# Patient Record
Sex: FEMALE | Race: WHITE | NOT HISPANIC OR LATINO | Employment: STUDENT | URBAN - METROPOLITAN AREA
[De-identification: names, ages, dates, MRNs, and addresses within clinical notes are randomized per-mention and may not be internally consistent; named-entity substitution may affect disease eponyms.]

---

## 2020-01-23 ENCOUNTER — HOSPITAL ENCOUNTER (EMERGENCY)
Facility: OTHER | Age: 25
Discharge: HOME | End: 2020-01-23
Attending: EMERGENCY MEDICINE
Payer: COMMERCIAL

## 2020-01-23 VITALS
RESPIRATION RATE: 18 BRPM | DIASTOLIC BLOOD PRESSURE: 83 MMHG | SYSTOLIC BLOOD PRESSURE: 127 MMHG | HEART RATE: 107 BPM | TEMPERATURE: 99 F | OXYGEN SATURATION: 100 % | WEIGHT: 110 LBS

## 2020-01-23 DIAGNOSIS — O03.9 MISCARRIAGE: Primary | ICD-10-CM

## 2020-01-23 LAB
B-HCG UR QL: NEGATIVE
CTP QC/QA: YES
HCG INTACT+B SERPL-ACNC: 12 MIU/ML

## 2020-01-23 PROCEDURE — 81025 URINE PREGNANCY TEST: CPT | Performed by: EMERGENCY MEDICINE

## 2020-01-23 PROCEDURE — 99283 EMERGENCY DEPT VISIT LOW MDM: CPT

## 2020-01-23 PROCEDURE — 84702 CHORIONIC GONADOTROPIN TEST: CPT

## 2020-01-24 NOTE — ED TRIAGE NOTES
Pt presents to the ed with c/o vaginal bleeding with abdominal cramping x 3hrs PTA. Pt reports being 4 weeks pregnant bleeding through 1 pad. Pt denies any other s/s. Pt is aao, vss, with NAD noted.     LOC: The patient is awake, alert, oriented and speaking appropriately at this time.  APPEARANCE: Patient resting comfortably and appears to be in no acute distress at this time. Patient is clean and well groomed, patient's clothing is properly fastened.  SKIN:The skin is warm and dry, color consistent with ethnicity, patient has normal skin turgor and moist mucus membranes, skin intact, no breakdown or brusing noted.  MUSCULOSKELETAL: Patient moving all extremities well, no obvious swelling or deformities noted.  RESPIRATORY: Airway is open and patent, respirations are spontaneous, patient has a normal effort and rate, no accessory muscle use noted.  CARDIAC: Patient has a normal rate and rhythm, no periphreal edema noted in any extremity, capillary refill < 3 seconds in all extremities  ABDOMEN: + lower abdominal cramping. Soft and non tender to palpation, no abdominal distention noted. Bowel sounds present in all four quadrants.  : + vaginal bleeding  NEUROLOGIC: Eyes open spontaneously, behavior appropriate to situation, follows commands, facial expression symmetrical, bilateral hand grasp equal and even, purposeful motor response noted, normal sensation in all extremities when touched with a finger.

## 2020-01-24 NOTE — ED PROVIDER NOTES
"Encounter Date: 2020    SCRIBE #1 NOTE: I, Paul Hammer, am scribing for, and in the presence of, Dr. Field.       History     Chief Complaint   Patient presents with    Vaginal Discharge     pt reports vaginal bleeding with onset 3 hours ago, abdominal cramping, reports 4 weeks pregnant     Time seen by provider: 9:07 PM    This is a 24 y.o. female who presents with complaint of 4/10 abdominal pain described as cramping that began 5 hours ago and vaginal bleeding that began 3 hours ago. The patient reports vaginal bleeding initially began as light spotting and gradually increased "to about as heavy as a period". She reports having a confirmed pregnancy 4 days ago by home test and is 4 weeks pregnant by LMP. She reports she is A0. The patient denies recent fever, chills, rhinorrhea, sore throat, cough, or urinary symptoms. The patient denies major medical hx or daily medication use. She reports surgical hx of tonsillectomy. She denies tobacco, alcohol, or drug use.    The history is provided by the patient.     Review of patient's allergies indicates:   Allergen Reactions    Penicillins      No past medical history on file.  No past surgical history on file.  No family history on file.  Social History     Tobacco Use    Smoking status: Not on file   Substance Use Topics    Alcohol use: Not on file    Drug use: Not on file     Review of Systems   Constitutional: Negative for chills and fever.   HENT: Negative for congestion and sore throat.    Eyes: Negative for visual disturbance.   Respiratory: Negative for cough and shortness of breath.    Cardiovascular: Negative for chest pain and palpitations.   Gastrointestinal: Positive for abdominal pain. Negative for diarrhea and vomiting.        + Abdominal cramping.   Genitourinary: Positive for vaginal bleeding. Negative for decreased urine volume, dysuria and vaginal discharge.   Musculoskeletal: Negative for joint swelling, neck pain and neck " stiffness.   Skin: Negative for rash and wound.   Neurological: Negative for weakness, numbness and headaches.   Psychiatric/Behavioral: Negative for confusion.   All other systems reviewed and are negative.      Physical Exam     Initial Vitals [01/23/20 2033]   BP Pulse Resp Temp SpO2   (!) 155/103 106 18 98.9 °F (37.2 °C) 99 %      MAP       --         Physical Exam    Nursing note and vitals reviewed.  Constitutional: She appears well-developed and well-nourished. She is not diaphoretic. She appears distressed (tearful).   HENT:   Head: Normocephalic and atraumatic.   Right Ear: External ear normal.   Left Ear: External ear normal.   Nose: Nose normal.   Mouth/Throat: Oropharynx is clear and moist. No oropharyngeal exudate.   Eyes: Conjunctivae and EOM are normal. Pupils are equal, round, and reactive to light. No scleral icterus.   Neck: Normal range of motion. Neck supple. No JVD present.   Cardiovascular: Normal heart sounds and intact distal pulses. Exam reveals no gallop and no friction rub.    No murmur heard.  Mild tachycardia.   Pulmonary/Chest: Breath sounds normal. No respiratory distress. She has no wheezes. She has no rhonchi. She has no rales.   Abdominal: Soft. She exhibits no distension. There is no tenderness. There is no rebound and no guarding.   Genitourinary:   Genitourinary Comments: Chaperone present. Normal external genitalia.  Mild active bleeding from cervix. Cervix is closed.  No CMT or uterine or adnexal tenderness.   Musculoskeletal: Normal range of motion. She exhibits no edema or tenderness.   No edema.   Neurological: She is alert and oriented to person, place, and time. GCS score is 15. GCS eye subscore is 4. GCS verbal subscore is 5. GCS motor subscore is 6.   Skin: Skin is warm and dry.   Psychiatric: Her behavior is normal. Thought content normal.         ED Course   Procedures  Labs Reviewed   HCG, QUANTITATIVE, PREGNANCY   POCT URINE PREGNANCY          Imaging Results     None          Medical Decision Making:   History:   Old Medical Records: I decided to obtain old medical records.  Clinical Tests:   Lab Tests: Ordered and Reviewed  ED Management:  Emergent evaluation a 24-year-old female who presents with complaint of vaginal bleeding in early pregnancy.  However, pregnancy test here is negative. Patient states that she had a positive home test 4 days ago.  Blood HCG level is 12.  This is likely a miscarriage in very early pregnancy.  She is counseled to follow up with her OBGYN to ensure level returns to 0.  She is advised close follow-up and return here for new or worsening symptoms. Given low HCG level, unlikely anything would show on ultrasound at this point.  Given low HCG level, and early dates, no need for blood typing at this point.            Scribe Attestation:   Scribe #1: I performed the above scribed service and the documentation accurately describes the services I performed. I attest to the accuracy of the note.    Attending Attestation:           Physician Attestation for Scribe:  Physician Attestation Statement for Scribe #1: I, Dr. Field, reviewed documentation, as scribed by Paul Hammer in my presence, and it is both accurate and complete.                               Clinical Impression:     1. Miscarriage                                Lexie Field MD  01/25/20 0723